# Patient Record
Sex: FEMALE
[De-identification: names, ages, dates, MRNs, and addresses within clinical notes are randomized per-mention and may not be internally consistent; named-entity substitution may affect disease eponyms.]

---

## 2022-11-25 ENCOUNTER — NURSE TRIAGE (OUTPATIENT)
Dept: OTHER | Facility: CLINIC | Age: 32
End: 2022-11-25

## 2022-11-25 NOTE — TELEPHONE ENCOUNTER
Location of patient: [address] [place] at [facility] Name: Jone Madrigal MRN: [id number]    Subjective: Caller states \"at 6 week post partum appointment was rx'd birth control, in the past couple of days has been bleeding like a period, also breastfeeding. Enough to fill a panty liner, switched to a regular sized pad that is changed regularly for hygiene not because of saturation, passing a couple \"very small\" clots, can feel mild cramping \"like a period\"    Current Symptoms: vaginal bleeding, mild cramping    Onset: a few days ago    Associated Symptoms: NA    Pain Severity: 1/10; cramping; intermittent    What has been tried: wearing pads and monitoring    What makes it better or worse: nothing    Triage indicates for patient to [facility] advice provided, patient verbalizes understanding; denies any other questions or concerns; instructed to call back for any new or worsening symptoms.     Will monitor at home and call back if symptoms worsen or follow up with OBGYN if symptoms persist or become uncomfortable      Reason for Disposition   Normal postpartum bleeding   Irregular or unexpected bleeding or spotting    Protocols used: Postpartum - Vaginal Bleeding and Lochia-ADULT-AH, Contraception - Birth Control Pills - Progestin-Only Pills (POPs)-ADULT-AH